# Patient Record
Sex: MALE | Race: AMERICAN INDIAN OR ALASKA NATIVE
[De-identification: names, ages, dates, MRNs, and addresses within clinical notes are randomized per-mention and may not be internally consistent; named-entity substitution may affect disease eponyms.]

---

## 2022-05-23 ENCOUNTER — HOSPITAL ENCOUNTER (EMERGENCY)
Dept: HOSPITAL 5 - ED | Age: 27
Discharge: HOME | End: 2022-05-23
Payer: SELF-PAY

## 2022-05-23 VITALS — DIASTOLIC BLOOD PRESSURE: 84 MMHG | SYSTOLIC BLOOD PRESSURE: 133 MMHG

## 2022-05-23 DIAGNOSIS — K04.7: Primary | ICD-10-CM

## 2022-05-23 DIAGNOSIS — Z79.899: ICD-10-CM

## 2022-05-23 PROCEDURE — 99282 EMERGENCY DEPT VISIT SF MDM: CPT

## 2022-05-23 NOTE — EMERGENCY DEPARTMENT REPORT
ED ENT HPI





- General


Chief complaint: Dental/Oral


Stated complaint: SWOLLEN JAW/MOUTH PAIN


Time Seen by Provider: 05/23/22 12:02


Source: patient


Mode of arrival: Ambulatory


Limitations: No Limitations





- History of Present Illness


MD complaint: tooth pain


-: days(s) (4)


Severity: severe


Severity scale (0 -10): 9


Quality: other


Consistency: constant


Improves with: none


Worsens with: eating, other (palpation)


Context- Dental: history of dental caries, poor dental care


Associated Symptoms: gum swelling, toothache





- Related Data


                                  Previous Rx's











 Medication  Instructions  Recorded  Last Taken  Type


 


Acetaminophen/Codeine [Tylenol 1 tab PO Q6H PRN #12 tab 05/23/22 Unknown Rx





/Codeine # 3 tab]    


 


Clindamycin [Clindamycin CAP] 300 mg PO Q6H #40 cap 05/23/22 Unknown Rx


 


Ketorolac [Toradol] 10 mg PO Q6H PRN #20 05/23/22 Unknown Rx











                                    Allergies











Allergy/AdvReac Type Severity Reaction Status Date / Time


 


No Known Allergies Allergy   Verified 05/04/14 23:01














ED Dental HPI





- General


Chief complaint: Dental/Oral


Stated complaint: SWOLLEN JAW/MOUTH PAIN


Time Seen by Provider: 05/23/22 12:02


Source: patient


Mode of arrival: Ambulatory


Limitations: No Limitations





- Related Data


                                  Previous Rx's











 Medication  Instructions  Recorded  Last Taken  Type


 


Acetaminophen/Codeine [Tylenol 1 tab PO Q6H PRN #12 tab 05/23/22 Unknown Rx





/Codeine # 3 tab]    


 


Clindamycin [Clindamycin CAP] 300 mg PO Q6H #40 cap 05/23/22 Unknown Rx


 


Ketorolac [Toradol] 10 mg PO Q6H PRN #20 05/23/22 Unknown Rx











                                    Allergies











Allergy/AdvReac Type Severity Reaction Status Date / Time


 


No Known Allergies Allergy   Verified 05/04/14 23:01














ED Review of Systems


ROS: 


Stated complaint: SWOLLEN JAW/MOUTH PAIN


Other details as noted in HPI





Comment: All other systems reviewed and negative


Constitutional: denies: chills, fever


Eyes: denies: eye pain, eye discharge, vision change


ENT: dental pain


Respiratory: denies: cough, shortness of breath, wheezing


Cardiovascular: denies: chest pain, palpitations


Gastrointestinal: denies: abdominal pain, nausea, diarrhea


Genitourinary: denies: urgency, dysuria


Skin: denies: rash, lesions


Neurological: denies: headache, weakness, paresthesias


Psychiatric: denies: anxiety, depression





ED Past Medical Hx





- Past Medical History


Previous Medical History?: No





- Surgical History


Past Surgical History?: No





- Social History


Smoking Status: Current Every Day Smoker


Substance Use Type: None





- Medications


Home Medications: 


                                Home Medications











 Medication  Instructions  Recorded  Confirmed  Last Taken  Type


 


Acetaminophen/Codeine [Tylenol 1 tab PO Q6H PRN #12 tab 05/23/22  Unknown Rx





/Codeine # 3 tab]     


 


Clindamycin [Clindamycin CAP] 300 mg PO Q6H #40 cap 05/23/22  Unknown Rx


 


Ketorolac [Toradol] 10 mg PO Q6H PRN #20 05/23/22  Unknown Rx














ED Physical Exam





- General


Limitations: No Limitations


General appearance: alert, in no apparent distress





- Head


Head exam: Present: atraumatic, normocephalic, normal inspection





- Eye


Eye exam: Present: normal appearance, PERRL, EOMI


Pupils: Present: normal accommodation





- ENT


ENT exam: Present: mucous membranes moist





- Expanded ENT Exam


  ** Expanded


Teeth exam: Present: dental caries





                            __________________________














                            __________________________





 1 - Dental Tenderness, Other (severe dental decay with small developing 

abscess)





Throat exam: Positive: normal inspection





- Neck


Neck exam: Present: normal inspection, full ROM.  Absent: lymphadenopathy





- Respiratory


Respiratory exam: Absent: respiratory distress





- Cardiovascular


Cardiovascular Exam: Present: regular rate





- Neurological Exam


Neurological exam: Present: alert, oriented X3, CN II-XII intact, normal gait





- Psychiatric


Psychiatric exam: Present: normal affect, normal mood





- Skin


Skin exam: Present: intact





ED Course





                                   Vital Signs











  05/23/22





  10:58


 


Temperature 98.4 F


 


Pulse Rate 84


 


Respiratory 16





Rate 


 


Blood Pressure 126/87





[Left] 


 


O2 Sat by Pulse 100





Oximetry 











Critical care attestation.: 


If time is entered above; I have spent that time in minutes in the direct care 

of this critically ill patient, excluding procedure time.








ED Disposition


Clinical Impression: 


 Dental abscess





Disposition: 01 HOME / SELF CARE / HOMELESS


Is pt being admited?: No


Does the pt Need Aspirin: No


Condition: Stable


Instructions:  Dental Abscess, Easy-to-Read


Additional Instructions: 


Take the clindamycin which is antibiotic as prescribed to completion.  Take the 

Toradol and the Tylenol 3's help with any pain.  I do recommend that you follow-

up with soon as possible with dentist.  Return to the ER if worse.


Prescriptions: 


Clindamycin [Clindamycin CAP] 300 mg PO Q6H #40 cap


Ketorolac [Toradol] 10 mg PO Q6H PRN #20


 PRN Reason: Pain


Acetaminophen/Codeine [Tylenol /Codeine # 3 tab] 1 tab PO Q6H PRN #12 tab


 PRN Reason: Pain , Severe (7-10)


Referrals: 


ISHAN TELLEZ MD [Staff Physician] - 3-5 Days


Forms:  Work/School Release Form(ED)


Time of Disposition: 12:28